# Patient Record
Sex: MALE | Race: WHITE | Employment: OTHER | URBAN - METROPOLITAN AREA
[De-identification: names, ages, dates, MRNs, and addresses within clinical notes are randomized per-mention and may not be internally consistent; named-entity substitution may affect disease eponyms.]

---

## 2017-02-17 ENCOUNTER — ALLIED HEALTH/NURSE VISIT (OUTPATIENT)
Dept: CARDIOLOGY | Facility: CLINIC | Age: 82
End: 2017-02-17
Payer: COMMERCIAL

## 2017-02-17 DIAGNOSIS — Z95.0 CARDIAC PACEMAKER IN SITU: Primary | ICD-10-CM

## 2017-02-17 PROCEDURE — 93296 REM INTERROG EVL PM/IDS: CPT | Performed by: INTERNAL MEDICINE

## 2017-02-17 PROCEDURE — 93294 REM INTERROG EVL PM/LDLS PM: CPT | Performed by: INTERNAL MEDICINE

## 2017-02-17 NOTE — MR AVS SNAPSHOT
"              After Visit Summary   2/17/2017    Bryson Saenz    MRN: [id number]           Patient Information     Date Of Birth          8/20/1922        Visit Information        Provider Department      2/17/2017 8:00 AM RODRIGUEZ TECH1 [facility] [place]        Today's Diagnoses     Cardiac pacemaker in situ    -  1       Follow-ups after your visit        Additional Services     Follow-Up with Cardiologist           Follow-Up with Device Clinic                 Future tests that were ordered for you today     Open Future Orders        Priority Expected Expires Ordered    Follow-Up with Device Clinic Routine 5/17/2017 3/24/2018 2/17/2017    Follow-Up with Cardiologist Routine 5/17/2017 2/17/2018 2/17/2017            Who to contact     If you have questions or need follow up information about today's clinic visit or your schedule please contact [facility] directly at [phone].  Normal or non-critical lab and imaging results will be communicated to you by MyChart, letter or phone within 4 business days after the clinic has received the results. If you do not hear from us within 7 days, please contact the clinic through RF[employer] Global Solutionhart or phone. If you have a critical or abnormal lab result, we will notify you by phone as soon as possible.  Submit refill requests through Sponsify or call your pharmacy and they will forward the refill request to us. Please allow 3 business days for your refill to be completed.          Additional Information About Your Visit        MyChart Information     Sponsify lets you send messages to your doctor, view your test results, renew your prescriptions, schedule appointments and more. To sign up, go to [email or web address] . Click on \"Log in\" on the left side of the screen, which will take you to the Welcome page. Then click on \"Sign up Now\" on the right side of the page.     You will be asked to enter the access " code listed below, as well as some personal information. Please follow the directions to create your username and password.     Your access code is: [email or web address]  Expires: 2017  4:10 PM     Your access code will  in 90 days. If you need help or a new code, please call your [place] clinic or [phone].        Care EveryWhere ID     This is your Care EveryWhere ID. This could be used by other organizations to access your [place] medical records  [id number]         Blood Pressure from Last 3 Encounters:   16 118/62   05/06/15 124/57   04/16/15 124/52    Weight from Last 3 Encounters:   16 88 kg (194 lb)   05/05/15 87.5 kg (192 lb 14.4 oz)   04/16/15 90.6 kg (199 lb 12.8 oz)              We Performed the Following     INTERROGATION DEVICE EVAL REMOTE, PACER/ICD (74602)     PM DEVICE INTERROGATE REMOTE (02420)        Primary Care Provider Office Phone # Fax #    Andrew Raymundo [phone]       ANDREW RAYMUNDO MD [address] [facility] [address]        Thank you!     Thank you for choosing [facility] [place]  for your care. Our goal is always to provide you with excellent care. Hearing back from our patients is one way we can continue to improve our services. Please take a few minutes to complete the written survey that you may receive in the mail after your visit with us. Thank you!             Your Updated Medication List - Protect others around you: Learn how to safely use, store and throw away your medicines at www.disposemymeds.org.          This list is accurate as of: 17  4:10 PM.  Always use your most recent med list.                   Brand Name Dispense Instructions for use    acetaminophen 500 MG tablet    TYLENOL     Take 500-1,000 mg by mouth every 6 hours as needed for mild pain       allopurinol 300 MG tablet    ZYLOPRIM     Take 300 mg by mouth daily       aspirin 81 MG tablet      Take 81 mg by mouth daily        atorvastatin 40 MG tablet    LIPITOR    90 tablet    1TPOQD       FIBER-CAPS PO      Take by mouth daily       furosemide 20 MG tablet    LASIX     Take 20 mg by mouth daily       melatonin 3 MG tablet      Take by mouth nightly as needed for sleep       multivitamin Tabs tablet      Take 1 tablet by mouth daily

## 2017-05-25 ENCOUNTER — ALLIED HEALTH/NURSE VISIT (OUTPATIENT)
Dept: CARDIOLOGY | Facility: CLINIC | Age: 82
End: 2017-05-25
Payer: COMMERCIAL

## 2017-05-25 ENCOUNTER — OFFICE VISIT (OUTPATIENT)
Dept: CARDIOLOGY | Facility: CLINIC | Age: 82
End: 2017-05-25
Attending: INTERNAL MEDICINE
Payer: COMMERCIAL

## 2017-05-25 VITALS
BODY MASS INDEX: 28.05 KG/M2 | HEIGHT: 70 IN | HEART RATE: 81 BPM | SYSTOLIC BLOOD PRESSURE: 114 MMHG | WEIGHT: 195.9 LBS | DIASTOLIC BLOOD PRESSURE: 64 MMHG

## 2017-05-25 DIAGNOSIS — I49.5 SSS (SICK SINUS SYNDROME) (H): Primary | ICD-10-CM

## 2017-05-25 DIAGNOSIS — Z95.0 CARDIAC PACEMAKER IN SITU: ICD-10-CM

## 2017-05-25 PROCEDURE — 93280 PM DEVICE PROGR EVAL DUAL: CPT | Performed by: INTERNAL MEDICINE

## 2017-05-25 PROCEDURE — 99214 OFFICE O/P EST MOD 30 MIN: CPT | Mod: 25 | Performed by: INTERNAL MEDICINE

## 2017-05-25 NOTE — PROGRESS NOTES
HPI and Plan:   See dictation    Orders Placed This Encounter   Procedures     Follow-Up with Cardiologist       Orders Placed This Encounter   Medications     Ammonium Lactate (LAC-HYDRIN FIVE) 5 % LOTN     Sig: Externally apply topically as needed       Encounter Diagnosis   Name Primary?     Cardiac pacemaker in situ        CURRENT MEDICATIONS:  Current Outpatient Prescriptions   Medication Sig Dispense Refill     Ammonium Lactate (LAC-HYDRIN FIVE) 5 % LOTN Externally apply topically as needed       acetaminophen (TYLENOL) 500 MG tablet Take 500-1,000 mg by mouth every 6 hours as needed for mild pain       furosemide (LASIX) 20 MG tablet Take 20 mg by mouth daily       melatonin 3 MG tablet Take by mouth nightly as needed for sleep       multivitamin (OCUVITE) TABS Take 1 tablet by mouth daily       allopurinol (ZYLOPRIM) 300 MG tablet Take 300 mg by mouth daily         ALLERGIES   No Known Allergies    PAST MEDICAL HISTORY:  Past Medical History:   Diagnosis Date     CAD (coronary artery disease)     05/2003 PTCA/Stent LAD , [place]     Cholesterol serum increased      HTN (hypertension)      Hypercholesteremia      Macular degeneration     left eye     Myocardial infarction (H) years ago    with spent placement     Pacemaker      Renal insufficiency      TIA (transient ischaemic attack)      Ventricular tachycardia (H)        PAST SURGICAL HISTORY:  Past Surgical History:   Procedure Laterality Date     HC LEFT HEART CATHETERIZATION  2003 05/2003 PTCA/Stent LAD , [place]     HIP SURGERY         FAMILY HISTORY:  Family History   Problem Relation Age of Onset     Dementia Mother      Aneurysm Father      HEART DISEASE No family hx of        SOCIAL HISTORY:  Social History     Social History     Marital status:      Spouse name: N/A     Number of children: N/A     Years of education: N/A     Social History Main Topics     Smoking status: Former Smoker     Smokeless tobacco: None       "Comment: 1963     Alcohol use Yes      Comment: scotch every once in awhile (1 tonight)     Drug use: No     Sexual activity: Not Asked     Other Topics Concern     Parent/Sibling W/ Cabg, Mi Or Angioplasty Before 65f 55m? No     Weight Concern No     Exercise No     Seat Belt No     Social History Narrative       Review of Systems:  Skin:  Negative     Eyes:  Positive for glasses  ENT:  Negative    Respiratory:  Positive for wheezing  Cardiovascular:    edema;Positive for  Gastroenterology: Negative    Genitourinary:  not assessed    Musculoskeletal:  Negative    Neurologic:  Negative    Psychiatric:  Negative    Heme/Lymph/Imm:  Negative    Endocrine:  Negative      Physical Exam:  Vitals: /64  Pulse 81  Ht 1.778 m (5' 10\")  Wt 88.9 kg (195 lb 14.4 oz)  BMI 28.11 kg/m2    Constitutional:  cooperative, alert and oriented, well developed, well nourished, in no acute distress        Skin:    venous stasis changes      Head:  normocephalic, no masses or lesions        Eyes:  pupils equal and round, conjunctivae and lids unremarkable, sclera white, no xanthalasma, EOMS intact, no nystagmus        ENT:  no pallor or cyanosis, dentition good        Neck:  carotid pulses are full and equal bilaterally, JVP normal, no carotid bruit, no thyromegaly        Chest:  normal breath sounds, clear to auscultation, normal A-P diameter, normal symmetry, normal respiratory excursion, no use of accessory muscles        Cardiac: regular rhythm, normal S1/S2, no S3 or S4, apical impulse not displaced, no murmurs, gallops or rubs                  Abdomen:  abdomen soft, non-tender, BS normoactive, no mass, no HSM, no bruits        Vascular:   pulses below the femoral arteries are diminished                                    Extremities and Back:  no spinal abnormalities noted;normal muscle strength and tone        Neurological:             Recent Lab Results:  LIPID RESULTS:  Lab Results   Component Value Date    CHOL 177 " 09/02/2015    HDL 57 09/02/2015    LDL 85 09/02/2015    TRIG 173 09/02/2015    CHOLHDLRATIO 2.6 11/05/2005       LIVER ENZYME RESULTS:  Lab Results   Component Value Date    AST 25 07/26/2013    ALT 30 07/26/2013       CBC RESULTS:  Lab Results   Component Value Date    WBC 9.7 09/02/2015    RBC 4.52 05/05/2015    HGB 13.4 09/02/2015    HCT 40.9 05/05/2015    MCV 95.2 09/02/2015    MCH 30.3 05/05/2015    MCHC 33.5 05/05/2015    RDW 14.4 05/05/2015     09/02/2015     (L) 05/05/2015       BMP RESULTS:  Lab Results   Component Value Date     04/04/2016    POTASSIUM 4.1 04/04/2016    CHLORIDE 101 04/04/2016    CO2 23 04/04/2016    ANIONGAP 7 05/05/2015     (A) 04/04/2016    BUN 39 04/04/2016    CR 1.74 04/04/2016    GFRESTIMATED 39 (L) 05/05/2015    GFRESTBLACK 48 (L) 05/05/2015    CHESTER 9.1 04/04/2016        A1C RESULTS:  No results found for: A1C    INR RESULTS:  Lab Results   Component Value Date    INR 1.04 07/26/2013    INR 0.98 07/02/2013           CC  Tavares Law MD   [facility]  [address]    [place] [address]

## 2017-05-25 NOTE — PROGRESS NOTES
[place] [employer] Advantio (D) Pacemaker Device Check  AP: 41 % : <1 %  Mode: DDDR         Underlying Rhythm: SR  Heart Rate: good variability  Sensing: WNL    Pacing Threshold: WNL   Impedance: WNL  Battery Status: 5.5 yrs estimated longevity   Atrial Arrhythmia: none  Ventricular Arrhythmia: 3 episodes of NSVT, 2 with EGMs. First EGM shows 9 beats of NSVT,  bpm. Second EGM shows 2 beats of NSVT, followed by on AP/VS beat, followed by another NSVT 4 beats, average V rate 162 bpm.   Setting Change: none    Care Plan: remote in 3 months, scheduled.   Pt has OV with Dr. Dawkins today.    MARCIA RN

## 2017-05-25 NOTE — LETTER
5/25/2017    Andrew العلي MD  [address] [place] [address]      RE: Bryson Saenz       Dear Colleague,    I had the pleasure of seeing Bryson Saenz in the [facility].    It is a pleasure for me to follow up with Mr. Saenz.  He is a very delightful 89 year gentleman who remains physically vigorous.  He does have sick sinus syndrome for which a pacemaker has been implanted.  There was a diagnosis of paroxysmal atrial fibrillation as well, though I am very happy to see that recent pacer interrogation has not revealed any episodes of atrial fibrillation.  In addition, there is a history of coronary artery disease.  In 2003, he had stenting to his LAD and possibly the PDA in [place].      He is quite active.  He reports no chest pains on exertion.  He tells me he feels well.  He has no PND or orthopnea.  He does have ankle swelling which I think is chiefly due to venous stasis.  If it gets bad he would take Lasix as required.  He is bothered by bruising on his hand.  He has stopped taking aspirin and this condition has improved.      PHYSICAL EXAMINATION:  Reveals a normal JVP, clear chest and normal heart sounds.     Outpatient Encounter Prescriptions as of 5/25/2017   Medication Sig Dispense Refill     Ammonium Lactate (LAC-HYDRIN FIVE) 5 % LOTN Externally apply topically as needed       acetaminophen (TYLENOL) 500 MG tablet Take 500-1,000 mg by mouth every 6 hours as needed for mild pain       furosemide (LASIX) 20 MG tablet Take 20 mg by mouth daily       melatonin 3 MG tablet Take by mouth nightly as needed for sleep       multivitamin (OCUVITE) TABS Take 1 tablet by mouth daily       allopurinol (ZYLOPRIM) 300 MG tablet Take 300 mg by mouth daily       [DISCONTINUED] Calcium Polycarbophil (FIBER-CAPS PO) Take by mouth daily       [DISCONTINUED] aspirin 81 MG tablet Take 81 mg by mouth daily       [DISCONTINUED] atorvastatin (LIPITOR) 40 MG tablet 1TPOQD  90 tablet 3     No facility-administered encounter medications on file as of 5/25/2017.       IMPRESSION:   1.  Coronary artery disease.  He is not on any treatment for it.  I did discuss the possibility of restarting aspirin with him and I think this would be good.   2.  Dyslipidemia, not on a statin by intention.   3.  Good blood pressure control.   4.  Paroxysmal atrial fibrillation.  Not on oral anticoagulation per patient preference.   5.  Sick sinus syndrome status post pacer insertion.  No evidence of pacer dysfunction.  I will continue to follow up with him at annual intervals.     Again, thank you for allowing me to participate in the care of your patient.      Sincerely,    DR MOISE CAVAZOS MD     [facility]

## 2017-05-25 NOTE — MR AVS SNAPSHOT
"              After Visit Summary   5/25/2017    Bryson Saenz    MRN: [id number]           Patient Information     Date Of Birth          8/20/1922        Visit Information        Provider Department      5/25/2017 4:15 PM Vernon Dawkins MD [facility]        Today's Diagnoses     Cardiac pacemaker in situ           Follow-ups after your visit        Your next 10 appointments already scheduled     Aug 28, 2017  8:00 AM CDT   Remote PPM Check with RODRIGUEZ TECH1   [facility] ([facility])    [address] [place] [id number] [phone]           This appointment is for a remote check of your pacemaker.  This is not an appointment at the office.              Who to contact     If you have questions or need follow up information about today's clinic visit or your schedule please contact [facility] directly at [phone].  Normal or non-critical lab and imaging results will be communicated to you by startuplyhart, letter or phone within 4 business days after the clinic has received the results. If you do not hear from us within 7 days, please contact the clinic through startuplyhart or phone. If you have a critical or abnormal lab result, we will notify you by phone as soon as possible.  Submit refill requests through [employer] or call your pharmacy and they will forward the refill request to us. Please allow 3 business days for your refill to be completed.          Additional Information About Your Visit        startuplyharInquirly Information     [employer] lets you send messages to your doctor, view your test results, renew your prescriptions, schedule appointments and more. To sign up, go to [email or web address] . Click on \"Log in\" on the left side of the screen, which will take you to the Welcome page. Then click on \"Sign up Now\" on the right side of the page.     You will be " "asked to enter the access code listed below, as well as some personal information. Please follow the directions to create your username and password.     Your access code is: [email or web address]  Expires: 2017  3:44 PM     Your access code will  in 90 days. If you need help or a new code, please call your [place] clinic or [phone].        Care EveryWhere ID     This is your Care EveryWhere ID. This could be used by other organizations to access your [place] medical records  [id number]        Your Vitals Were     Pulse Height BMI (Body Mass Index)             81 1.778 m (5' 10\") 28.11 kg/m2          Blood Pressure from Last 3 Encounters:   17 114/64   16 118/62   05/06/15 124/57    Weight from Last 3 Encounters:   17 88.9 kg (195 lb 14.4 oz)   16 88 kg (194 lb)   05/05/15 87.5 kg (192 lb 14.4 oz)              We Performed the Following     Follow-Up with Cardiologist        Primary Care Provider Office Phone # Fax #    Andrew Raymundo [phone]       [place]RE[place]NDO [place] [address] [place] [address]        Thank you!     Thank you for choosing [facility] [place]  for your care. Our goal is always to provide you with excellent care. Hearing back from our patients is one way we can continue to improve our services. Please take a few minutes to complete the written survey that you may receive in the mail after your visit with us. Thank you!             Your Updated Medication List - Protect others around you: Learn how to safely use, store and throw away your medicines at www.disposemymeds.org.          This list is accurate as of: 17  4:46 PM.  Always use your most recent med list.                   Brand Name Dispense Instructions for use    acetaminophen 500 MG tablet    TYLENOL     Take 500-1,000 mg by mouth every 6 hours as needed for mild pain       allopurinol 300 MG tablet    ZYLOPRIM     Take 300 mg by mouth daily "       furosemide 20 MG tablet    LASIX     Take 20 mg by mouth daily       LAC-HYDRIN FIVE 5 % Lotn   Generic drug:  Ammonium Lactate      Externally apply topically as needed       melatonin 3 MG tablet      Take by mouth nightly as needed for sleep       multivitamin Tabs tablet      Take 1 tablet by mouth daily

## 2017-05-25 NOTE — MR AVS SNAPSHOT
After Visit Summary   5/25/2017    Bryson Saenz    MRN: [id number]           Patient Information     Date Of Birth          8/20/1922        Visit Information        Provider Department      5/25/2017 3:15 PM MICHAEL FERNANDEZN [facility]        Today's Diagnoses     SSS (sick sinus syndrome) (H)    -  1    Cardiac pacemaker in situ           Follow-ups after your visit        Your next 10 appointments already scheduled     May 25, 2017  4:15 PM CDT   Return Visit with Vernon Dawkins MD   [facility] ([facility])    [address]  [facility] [id number] [phone]            Aug 28, 2017  8:00 AM CDT   Remote PPM Check with MICHAEL TECH1   [facility] ([facility])    [address]  [facility] [id number] [phone]           This appointment is for a remote check of your pacemaker.  This is not an appointment at the office.              Who to contact     If you have questions or need follow up information about today's clinic visit or your schedule please contact [facility] directly at [phone].  Normal or non-critical lab and imaging results will be communicated to you by ACTV8hart, letter or phone within 4 business days after the clinic has received the results. If you do not hear from us within 7 days, please contact the clinic through ACTV8hart or phone. If you have a critical or abnormal lab result, we will notify you by phone as soon as possible.  Submit refill requests through [employer] or call your pharmacy and they will forward the refill request to us. Please allow 3 business days for your refill to be completed.          Additional Information About Your Visit        ACTV8hart Information     [employer] lets you send messages to your doctor, view your test results, renew your  "prescriptions, schedule appointments and more. To sign up, go to [email or web address] . Click on \"Log in\" on the left side of the screen, which will take you to the Welcome page. Then click on \"Sign up Now\" on the right side of the page.     You will be asked to enter the access code listed below, as well as some personal information. Please follow the directions to create your username and password.     Your access code is: [email or web address]  Expires: 2017  3:44 PM     Your access code will  in 90 days. If you need help or a new code, please call your [place] clinic or [phone].        Care EveryWhere ID     This is your Care EveryWhere ID. This could be used by other organizations to access your [place] medical records  [id number]         Blood Pressure from Last 3 Encounters:   16 118/62   05/06/15 124/57   04/16/15 124/52    Weight from Last 3 Encounters:   16 88 kg (194 lb)   05/05/15 87.5 kg (192 lb 14.4 oz)   04/16/15 90.6 kg (199 lb 12.8 oz)              We Performed the Following     Follow-Up with Device Clinic     PM DEVICE PROGRAMMING EVAL, DUAL LEAD PACER (27057)        Primary Care Provider Office Phone # Fax #    Andrew Raymundo [phone]       ANDREW RAYMUNDO [place] [address] [place] [address]        Thank you!     Thank you for choosing [facility] [place]  for your care. Our goal is always to provide you with excellent care. Hearing back from our patients is one way we can continue to improve our services. Please take a few minutes to complete the written survey that you may receive in the mail after your visit with us. Thank you!             Your Updated Medication List - Protect others around you: Learn how to safely use, store and throw away your medicines at www.disposemymeds.org.          This list is accurate as of: 17  3:44 PM.  Always use your most recent med list.                   Brand Name Dispense " Instructions for use    acetaminophen 500 MG tablet    TYLENOL     Take 500-1,000 mg by mouth every 6 hours as needed for mild pain       allopurinol 300 MG tablet    ZYLOPRIM     Take 300 mg by mouth daily       aspirin 81 MG tablet      Take 81 mg by mouth daily       atorvastatin 40 MG tablet    LIPITOR    90 tablet    1TPOQD       FIBER-CAPS PO      Take by mouth daily       furosemide 20 MG tablet    LASIX     Take 20 mg by mouth daily       melatonin 3 MG tablet      Take by mouth nightly as needed for sleep       multivitamin Tabs tablet      Take 1 tablet by mouth daily

## 2017-05-26 NOTE — PROGRESS NOTES
HISTORY OF PRESENT ILLNESS:  It is a pleasure for me to follow up with Mr. Johnston.  He is a very delightful 89 year gentleman who remains physically vigorous.  He does have sick sinus syndrome for which a pacemaker has been implanted.  There was a diagnosis of paroxysmal atrial fibrillation as well, though I am very happy to see that recent pacer interrogation has not revealed any episodes of atrial fibrillation.  In addition, there is a history of coronary artery disease.  In , he had stenting to his LAD and possibly the PDA in [place].      He is quite active.  He reports no chest pains on exertion.  He tells me he feels well.  He has no PND or orthopnea.  He does have ankle swelling which I think is chiefly due to venous stasis.  If it gets bad he would take Lasix as required.  He is bothered by bruising on his hand.  He has stopped taking aspirin and this condition has improved.      PHYSICAL EXAMINATION:  Reveals a normal JVP, clear chest and normal heart sounds.      IMPRESSION:   1.  Coronary artery disease.  He is not on any treatment for it.  I did discuss the possibility of restarting aspirin with him and I think this would be good.   2.  Dyslipidemia, not on a statin by intention.   3.  Good blood pressure control.   4.  Paroxysmal atrial fibrillation.  Not on oral anticoagulation per patient preference.   5.  Sick sinus syndrome status post pacer insertion.  No evidence of pacer dysfunction.  I will continue to follow up with him at annual intervals.         MOISE CAVAZOS MD, [facility]             D: 2017 16:56   T: 2017 14:11   MT: AMILCAR      Name:     HANNAH JOHNSTON   MRN:      [id number]        Account:      [id number]   :      1922           Service Date: 2017      Document: [id number]

## 2017-08-29 ENCOUNTER — ALLIED HEALTH/NURSE VISIT (OUTPATIENT)
Dept: CARDIOLOGY | Facility: CLINIC | Age: 82
End: 2017-08-29
Payer: COMMERCIAL

## 2017-08-29 DIAGNOSIS — Z95.0 CARDIAC PACEMAKER IN SITU: Primary | ICD-10-CM

## 2017-08-29 PROCEDURE — 93296 REM INTERROG EVL PM/IDS: CPT | Performed by: INTERNAL MEDICINE

## 2017-08-29 PROCEDURE — 93294 REM INTERROG EVL PM/LDLS PM: CPT | Performed by: INTERNAL MEDICINE

## 2017-08-29 NOTE — MR AVS SNAPSHOT
"              After Visit Summary   8/29/2017    Bryson Saenz    MRN: [id number]           Patient Information     Date Of Birth          8/20/1922        Visit Information        Provider Department      8/29/2017 4:45 PM RODRIGUEZ TECH1 [facility]        Today's Diagnoses     Cardiac pacemaker in situ    -  1       Follow-ups after your visit        Your next 10 appointments already scheduled     Aug 29, 2017  4:45 PM CDT   Remote PPM Check with RODRIGUEZ TECH1   [facility] ([facility])    [address] [place] [id number] [phone]           This appointment is for a remote check of your pacemaker.  This is not an appointment at the office.              Who to contact     If you have questions or need follow up information about today's clinic visit or your schedule please contact [facility] directly at [phone].  Normal or non-critical lab and imaging results will be communicated to you by Sapheonhart, letter or phone within 4 business days after the clinic has received the results. If you do not hear from us within 7 days, please contact the clinic through Sapheonhart or phone. If you have a critical or abnormal lab result, we will notify you by phone as soon as possible.  Submit refill requests through [employer] or call your pharmacy and they will forward the refill request to us. Please allow 3 business days for your refill to be completed.          Additional Information About Your Visit        Sapheonhart Information     [employer] lets you send messages to your doctor, view your test results, renew your prescriptions, schedule appointments and more. To sign up, go to [email or web address] . Click on \"Log in\" on the left side of the screen, which will take you to the Welcome page. Then click on \"Sign up Now\" on the right side of the page.     You will be asked to " enter the access code listed below, as well as some personal information. Please follow the directions to create your username and password.     Your access code is: [email or web address]  Expires: 2017  1:19 PM     Your access code will  in 90 days. If you need help or a new code, please call your [place] clinic or [phone].        Care EveryWhere ID     This is your Care EveryWhere ID. This could be used by other organizations to access your [place] medical records  [id number]         Blood Pressure from Last 3 Encounters:   17 114/64   16 118/62   05/06/15 124/57    Weight from Last 3 Encounters:   17 88.9 kg (195 lb 14.4 oz)   16 88 kg (194 lb)   05/05/15 87.5 kg (192 lb 14.4 oz)              We Performed the Following     INTERROGATION DEVICE EVAL REMOTE, PACER/ICD (32663)     PM DEVICE INTERROGATE REMOTE (24850)        Primary Care Provider Office Phone # Fax #    Minal العلي [phone]       MINAL العلي MD [address] [facility] [facility] [address]        Equal Access to Services     [facility] : Hadii aad ku hadasho Soomaali, waaxda luqadaha, qaybta kaalmada adeegyada, waxay idiin hayaan emileg lefty wyatt . So [facility] [phone].    ATENCIÓN: Si habla español, tiene a castro disposición servicios gratuitos de asistencia lingüística. Llame al [phone].    We comply with applicable federal civil rights laws and [place] laws. We do not discriminate on the basis of race, color, national origin, age, disability sex, sexual orientation or gender identity.            Thank you!     Thank you for choosing [facility]  for your care. Our goal is always to provide you with excellent care. Hearing back from our patients is one way we can continue to improve our services. Please take a few minutes to complete the written survey that you may receive in the mail after your visit with us. Thank you!             Your Updated  Medication List - Protect others around you: Learn how to safely use, store and throw away your medicines at www.disposemymeds.org.          This list is accurate as of: 8/29/17  1:19 PM.  Always use your most recent med list.                   Brand Name Dispense Instructions for use Diagnosis    acetaminophen 500 MG tablet    TYLENOL     Take 500-1,000 mg by mouth every 6 hours as needed for mild pain        allopurinol 300 MG tablet    ZYLOPRIM     Take 300 mg by mouth daily        furosemide 20 MG tablet    LASIX     Take 20 mg by mouth daily        LAC-HYDRIN FIVE 5 % Lotn   Generic drug:  Ammonium Lactate      Externally apply topically as needed        melatonin 3 MG tablet      Take by mouth nightly as needed for sleep        multivitamin Tabs tablet      Take 1 tablet by mouth daily

## 2017-08-29 NOTE — PROGRESS NOTES
[employer] Advantio K063 (D) Remote PPM Device Check  AP: 67% : 1%  Mode: DDDR        Presenting Rhythm: AP/VS  Heart Rate: adequate heart rates per histogram  Sensing: RA: not performed RV: stable    Pacing Threshold: RA: not performed RV: stable    Impedance: stable  Battery Status: 5 years remaining  Atrial Arrhythmia: 10 mode switch episodes comprising <1% of the time. EGMs available show Afib with all episodes occurring on the same day for a combined total of 1 hr and 52 min. Controlled vent response while in mode switch. Taking ASA only. Per Dr. Dawkins's last note, pt declines anticoagulants.   Ventricular Arrhythmia: none     Care Plan: F/U Latitude NXT q 3 months.  No answer, left message with results and next transmission date on nurse line at care facility. Radha FISHER

## 2017-11-28 ENCOUNTER — ALLIED HEALTH/NURSE VISIT (OUTPATIENT)
Dept: CARDIOLOGY | Facility: CLINIC | Age: 82
End: 2017-11-28
Payer: COMMERCIAL

## 2017-11-28 DIAGNOSIS — Z95.0 CARDIAC PACEMAKER IN SITU: Primary | ICD-10-CM

## 2017-11-28 PROCEDURE — 93294 REM INTERROG EVL PM/LDLS PM: CPT | Performed by: INTERNAL MEDICINE

## 2017-11-28 PROCEDURE — 93296 REM INTERROG EVL PM/IDS: CPT | Performed by: INTERNAL MEDICINE

## 2017-11-28 NOTE — MR AVS SNAPSHOT
"              After Visit Summary   2017    Bryson Saenz    MRN: [id number]           Patient Information     Date Of Birth          1922        Visit Information        Provider Department      2017 11:30 AM RODRIGUEZ TECH1 [facility]        Today's Diagnoses     Cardiac pacemaker in situ    -  1       Follow-ups after your visit        Who to contact     If you have questions or need follow up information about today's clinic visit or your schedule please contact [facility] directly at [phone].  Normal or non-critical lab and imaging results will be communicated to you by Dogeohart, letter or phone within 4 business days after the clinic has received the results. If you do not hear from us within 7 days, please contact the clinic through Mobile Patrol[employer] or phone. If you have a critical or abnormal lab result, we will notify you by phone as soon as possible.  Submit refill requests through [employer] or call your pharmacy and they will forward the refill request to us. Please allow 3 business days for your refill to be completed.          Additional Information About Your Visit        MyChart Information     [employer] lets you send messages to your doctor, view your test results, renew your prescriptions, schedule appointments and more. To sign up, go to [email or web address] . Click on \"Log in\" on the left side of the screen, which will take you to the Welcome page. Then click on \"Sign up Now\" on the right side of the page.     You will be asked to enter the access code listed below, as well as some personal information. Please follow the directions to create your username and password.     Your access code is: [email or web address]  Expires: 2018  1:35 PM     Your access code will  in 90 days. If you need help or a new code, please call your [place] clinic or [phone].        Care EveryWhere ID     This is your Care " EveryWhere ID. This could be used by other organizations to access your [facility] medical records  [id number]         Blood Pressure from Last 3 Encounters:   05/25/17 114/64   04/26/16 118/62   05/06/15 124/57    Weight from Last 3 Encounters:   05/25/17 88.9 kg (195 lb 14.4 oz)   04/26/16 88 kg (194 lb)   05/05/15 87.5 kg (192 lb 14.4 oz)              We Performed the Following     INTERROGATION DEVICE EVAL REMOTE, PACER/ICD (81234)     PM DEVICE INTERROGATE REMOTE (99506)        Primary Care Provider Office Phone # Fax #    Minal العلي [phone]       MINAL العلي MD [address] [place] [place] [address]        Equal Access to Services     [facility]: Hadii aad ku hadasho Soomaali, waaxda luqadaha, qaybta kaalmada adeegyada, waxay idiin hayaan mounika kharaluisana wyatt . So [facility] [phone].    ATENCIÓN: Si habla español, tiene a castro disposición servicios gratuitos de asistencia lingüística. Adrian al [phone].    We comply with applicable federal civil rights laws and [place] laws. We do not discriminate on the basis of race, color, national origin, age, disability, sex, sexual orientation, or gender identity.            Thank you!     Thank you for choosing [facility]  for your care. Our goal is always to provide you with excellent care. Hearing back from our patients is one way we can continue to improve our services. Please take a few minutes to complete the written survey that you may receive in the mail after your visit with us. Thank you!             Your Updated Medication List - Protect others around you: Learn how to safely use, store and throw away your medicines at www.disposemymeds.org.          This list is accurate as of: 11/28/17  1:35 PM.  Always use your most recent med list.                   Brand Name Dispense Instructions for use Diagnosis    acetaminophen 500 MG tablet    TYLENOL     Take 500-1,000 mg by mouth every 6 hours  as needed for mild pain        allopurinol 300 MG tablet    ZYLOPRIM     Take 300 mg by mouth daily        furosemide 20 MG tablet    LASIX     Take 20 mg by mouth daily        LAC-HYDRIN FIVE 5 % Lotn   Generic drug:  Ammonium Lactate      Externally apply topically as needed        melatonin 3 MG tablet      Take by mouth nightly as needed for sleep        multivitamin Tabs tablet      Take 1 tablet by mouth daily

## 2017-11-28 NOTE — PROGRESS NOTES
[employer] Advantio (D) Remote PPM Device Check  AP: 58 % : 1 %  Mode: DDDR        Presenting Rhythm: AS/VS, rate 80bpm  Heart Rate: Adequate rates per histogram  Sensing: Stable    Pacing Threshold: Stable    Impedance: Stable  Battery Status: 5 years  Atrial Arrhythmia: 10 mode switch episodes comprising <1% of the time (13 minutes 25 seconds). Longest episode lasted 4 minutes 25 seconds. EGMs available show Afib, all but one episode occurring on the same day. Taking ASA only. Per Dr. Dawkins's last note, pt declines anticoagulants. .  Ventricular Arrhythmia: 1 ventricular high rate. EGM shows As=Vs for PAT lasting 12 beats, rates 105-160bpm. Reviewed with LORA Joseph.      Care Plan: F/u PPM Latitude q 3 months. LM with results. PHILLIP [place]

## 2018-01-10 ENCOUNTER — RECORDS - HEALTHEAST (OUTPATIENT)
Dept: LAB | Facility: CLINIC | Age: 83
End: 2018-01-10

## 2018-01-11 LAB
ANION GAP SERPL CALCULATED.3IONS-SCNC: 8 MMOL/L (ref 5–18)
BUN SERPL-MCNC: 44 MG/DL (ref 8–28)
CALCIUM SERPL-MCNC: 8.8 MG/DL (ref 8.5–10.5)
CHLORIDE BLD-SCNC: 109 MMOL/L (ref 98–107)
CO2 SERPL-SCNC: 25 MMOL/L (ref 22–31)
CREAT SERPL-MCNC: 1.4 MG/DL (ref 0.7–1.3)
GFR SERPL CREATININE-BSD FRML MDRD: 47 ML/MIN/1.73M2
GLUCOSE BLD-MCNC: 128 MG/DL (ref 70–125)
POTASSIUM BLD-SCNC: 4.1 MMOL/L (ref 3.5–5)
SODIUM SERPL-SCNC: 142 MMOL/L (ref 136–145)

## 2018-03-20 ENCOUNTER — ALLIED HEALTH/NURSE VISIT (OUTPATIENT)
Dept: CARDIOLOGY | Facility: CLINIC | Age: 83
End: 2018-03-20
Payer: COMMERCIAL

## 2018-03-20 DIAGNOSIS — Z95.0 CARDIAC PACEMAKER IN SITU: Primary | ICD-10-CM

## 2018-03-20 PROCEDURE — 93294 REM INTERROG EVL PM/LDLS PM: CPT | Performed by: INTERNAL MEDICINE

## 2018-03-20 PROCEDURE — 93296 REM INTERROG EVL PM/IDS: CPT | Performed by: INTERNAL MEDICINE

## 2018-03-20 NOTE — PROGRESS NOTES
[employer] Advantio (D) Remote PPM Device Check  AP: 61 % : 1 %  Mode: DDDR        Presenting Rhythm: AP/VS  Heart Rate: Adequate rates per histogram  Sensing: Stable    Pacing Threshold: Stable    Impedance: Stable  Battery Status: 4.5 years  Atrial Arrhythmia: 10 mode switch episodes comprising <1% of the time. Longest episode lasted 10 minutes 57 seconds, ventricular rates controlled. EGMs available suggestive of Afib, majority of episodes occurred on the same day. Taking ASA only. Per Dr. Dawkins's last note, pt declines anticoagulants.  Ventricular Arrhythmia: 3 ventricular high rates. 2 EGMs available show As>Vs for AFib with RVR lasting 2-4 seconds, rates 135-170bpm. Reviewed with LORA Ratliff.       Care Plan: F/u Annual threshold in 3 months, order placed. LM with results.  PHILLIP [place]

## 2018-05-21 ENCOUNTER — OFFICE VISIT (OUTPATIENT)
Dept: CARDIOLOGY | Facility: CLINIC | Age: 83
End: 2018-05-21
Attending: INTERNAL MEDICINE
Payer: COMMERCIAL

## 2018-05-21 ENCOUNTER — ALLIED HEALTH/NURSE VISIT (OUTPATIENT)
Dept: CARDIOLOGY | Facility: CLINIC | Age: 83
End: 2018-05-21
Payer: COMMERCIAL

## 2018-05-21 VITALS
BODY MASS INDEX: 28.22 KG/M2 | DIASTOLIC BLOOD PRESSURE: 52 MMHG | SYSTOLIC BLOOD PRESSURE: 110 MMHG | HEART RATE: 76 BPM | WEIGHT: 197.1 LBS | HEIGHT: 70 IN

## 2018-05-21 DIAGNOSIS — Z95.0 CARDIAC PACEMAKER IN SITU: ICD-10-CM

## 2018-05-21 DIAGNOSIS — E78.49 OTHER HYPERLIPIDEMIA: ICD-10-CM

## 2018-05-21 DIAGNOSIS — I49.5 SSS (SICK SINUS SYNDROME) (H): Primary | ICD-10-CM

## 2018-05-21 DIAGNOSIS — I25.10 CORONARY ARTERY DISEASE INVOLVING NATIVE CORONARY ARTERY OF NATIVE HEART WITHOUT ANGINA PECTORIS: Primary | ICD-10-CM

## 2018-05-21 PROCEDURE — 93280 PM DEVICE PROGR EVAL DUAL: CPT | Performed by: INTERNAL MEDICINE

## 2018-05-21 PROCEDURE — 99214 OFFICE O/P EST MOD 30 MIN: CPT | Performed by: INTERNAL MEDICINE

## 2018-05-21 NOTE — LETTER
5/21/2018      MD Andrew [place] Md [address]   [place] [address]      RE: Bryson Saenz       Dear Colleague,    I had the pleasure of seeing Brysno Saenz in the [facility].    Service Date: 05/21/2018      HISTORY OF PRESENT ILLNESS:  It is always a pleasure for me to see Mr. Saenz who is accompanied by his daughter.  He is here for followup of cardiac pacemaker, paroxysmal atrial fibrillation, coronary artery disease.  By his daughter, Deepthi, description I think he may have had some episodes of diastolic heart failure as well.      This gentleman is now 89.  In 2003, he had stenting to his LAD and possibly the PDA in [place].  Unfortunately, we do not have those records.  Since then he has been diagnosed with sick sinus syndrome.  A pacer was implanted.  He has atrial fibrillation detected as well.  Fortunately, this is paroxysmal.  Pacer interrogation demonstrated less than 1% atrial fibrillation burden with the longest episode being only 12 minutes.  By mutual agreement he is not on oral anticoagulation and he is doing fine.        He has no chest pains.  His daughter describes occasional episodes of coughing with shortness of breath and increased fluid swelling, but these have all responded nicely to an increased dose of Lasix.  By mutual agreement, he is also not on a statin.        Cardiac examination today reveals no evidence of congestive heart failure.      IMPRESSION:   1.  Stable coronary artery disease with no symptoms of angina.   2.  Probable episodic diastolic heart failure with good response to oral Lasix.  His daughter is a retired anesthesiologist and she is extremely competent in watching over him and increasing diuretic dosage as necessary.   3.  Decent blood pressure.   4.  Sick sinus syndrome with normally functioning pacemaker.   5.  Paroxysmal atrial fibrillation.  Not a big issue at this time.      I will continue  annual followup.         MOISE CAVAZOS MD, [facility]             D: 2018   T: 2018   MT: AMILCAR      Name:     HANNAH JOHNSTON   MRN:      [id number]        Account:      [id number]   :      1922           Service Date: 2018      Document: [id number]         Outpatient Encounter Prescriptions as of 2018   Medication Sig Dispense Refill     acetaminophen (TYLENOL) 500 MG tablet Take 500-1,000 mg by mouth every 6 hours as needed for mild pain       allopurinol (ZYLOPRIM) 300 MG tablet Take 300 mg by mouth daily       Ammonium Lactate (LAC-HYDRIN FIVE) 5 % LOTN Externally apply topically as needed       aspirin 81 MG tablet Take by mouth daily       furosemide (LASIX) 20 MG tablet Take 20 mg by mouth daily       melatonin 3 MG tablet Take by mouth nightly as needed for sleep       multivitamin (OCUVITE) TABS Take 1 tablet by mouth daily       No facility-administered encounter medications on file as of 2018.        Again, thank you for allowing me to participate in the care of your patient.      Sincerely,    DR MOISE CAVAZOS MD     [facility]

## 2018-05-21 NOTE — PROGRESS NOTES
[place] [employer] Advantio (D) Pacemaker Device Check    AP: 66 % : <1 %  Mode: DDDR         Underlying Rhythm: SB 50s  Heart Rate: stable with good variability, patient ambulates slowly with a walker  Sensing: WNL    Pacing Threshold: WNL   Impedance: WNL  Battery Status: estimated 4 years longevity remaining   Device Site: remains well healed  Atrial Arrhythmia: Since 3/20 there have been 9 ATR episodes, 7 occurring on 5/14 - longest duration 12 minutes. Total burden for the year is <1%. He declines anticoagulation  Ventricular Arrhythmia: 2 episodes logged as NSVT - EGMs show 2-4 seconds, rates in the 140s-150s  Setting Change: no changes made today     Care Plan: Return to Q3 month remote checks on 8/27/18 - he is seeing Dr Dawkins today. SP

## 2018-05-21 NOTE — MR AVS SNAPSHOT
"              After Visit Summary   5/21/2018    Bryson Saenz    MRN: [id number]           Patient Information     Date Of Birth          8/20/1922        Visit Information        Provider Department      5/21/2018 9:45 AM Vernon Dawkins MD [facility]        Today's Diagnoses     Coronary artery disease involving native coronary artery of native heart without angina pectoris    -  1    Cardiac pacemaker in situ        Other hyperlipidemia           Follow-ups after your visit        Your next 10 appointments already scheduled     Aug 27, 2018  8:00 AM CDT   Remote PPM Check with RODRIGUEZ TECH1   [facility] ([facility])    [address]  [facility] [id number] [phone] OPT 2           This appointment is for a remote check of your pacemaker.  This is not an appointment at the office.              Who to contact     If you have questions or need follow up information about today's clinic visit or your schedule please contact [facility] directly at [phone].  Normal or non-critical lab and imaging results will be communicated to you by Individual Digitalhart, letter or phone within 4 business days after the clinic has received the results. If you do not hear from us within 7 days, please contact the clinic through [employer] or phone. If you have a critical or abnormal lab result, we will notify you by phone as soon as possible.  Submit refill requests through [employer] or call your pharmacy and they will forward the refill request to us. Please allow 3 business days for your refill to be completed.          Additional Information About Your Visit        Individual Digitalhart Information     [employer] lets you send messages to your doctor, view your test results, renew your prescriptions, schedule appointments and more. To sign up, go to [email or web address] . Click on \"Log in\" on the left side " "of the screen, which will take you to the Welcome page. Then click on \"Sign up Now\" on the right side of the page.     You will be asked to enter the access code listed below, as well as some personal information. Please follow the directions to create your username and password.     Your access code is: [email or web address]  Expires: 2018 11:56 AM     Your access code will  in 90 days. If you need help or a new code, please call your [place] clinic or [phone].        Care EveryWhere ID     This is your Care EveryWhere ID. This could be used by other organizations to access your [place] medical records  [id number]        Your Vitals Were     Pulse Height BMI (Body Mass Index)             76 1.778 m (5' 10\") 28.28 kg/m2          Blood Pressure from Last 3 Encounters:   18 110/52   17 114/64   16 118/62    Weight from Last 3 Encounters:   18 89.4 kg (197 lb 1.6 oz)   17 88.9 kg (195 lb 14.4 oz)   16 88 kg (194 lb)              We Performed the Following     Follow-Up with Cardiologist        Primary Care Provider Office Phone # Fax #    Andrew العلي [phone]       ANDREW العلي MD [address] [place] [address]        Equal Access to Services     [facility]: Hadii aad ku hadasho Soomaali, waaxda luqadaha, qaybta kaalmada adeegyada, waxay idiin hayaan mounika wyatt . So [facility] [phone].    ATENCIÓN: Si habla español, tiene a castro disposición servicios gratuitos de asistencia lingüística. Llame al [phone].    We comply with applicable federal civil rights laws and [place] laws. We do not discriminate on the basis of race, color, national origin, age, disability, sex, sexual orientation, or gender identity.            Thank you!     Thank you for choosing [facility]  for your care. Our goal is always to provide you with excellent care. Hearing back from our patients is one way we can " continue to improve our services. Please take a few minutes to complete the written survey that you may receive in the mail after your visit with us. Thank you!             Your Updated Medication List - Protect others around you: Learn how to safely use, store and throw away your medicines at www.disposemymeds.org.          This list is accurate as of 5/21/18 10:20 AM.  Always use your most recent med list.                   Brand Name Dispense Instructions for use Diagnosis    acetaminophen 500 MG tablet    TYLENOL     Take 500-1,000 mg by mouth every 6 hours as needed for mild pain        allopurinol 300 MG tablet    ZYLOPRIM     Take 300 mg by mouth daily        aspirin 81 MG tablet      Take by mouth daily        furosemide 20 MG tablet    LASIX     Take 20 mg by mouth daily        LAC-HYDRIN FIVE 5 % Lotn   Generic drug:  Ammonium Lactate      Externally apply topically as needed        melatonin 3 MG tablet      Take by mouth nightly as needed for sleep        multivitamin Tabs tablet      Take 1 tablet by mouth daily

## 2018-05-21 NOTE — PROGRESS NOTES
HPI and Plan:   See dictation    Orders Placed This Encounter   Procedures     Follow-Up with Cardiologist       Orders Placed This Encounter   Medications     aspirin 81 MG tablet     Sig: Take by mouth daily       Encounter Diagnoses   Name Primary?     Cardiac pacemaker in situ      Coronary artery disease involving native coronary artery of native heart without angina pectoris Yes     Other hyperlipidemia        CURRENT MEDICATIONS:  Current Outpatient Prescriptions   Medication Sig Dispense Refill     acetaminophen (TYLENOL) 500 MG tablet Take 500-1,000 mg by mouth every 6 hours as needed for mild pain       allopurinol (ZYLOPRIM) 300 MG tablet Take 300 mg by mouth daily       Ammonium Lactate (LAC-HYDRIN FIVE) 5 % LOTN Externally apply topically as needed       aspirin 81 MG tablet Take by mouth daily       furosemide (LASIX) 20 MG tablet Take 20 mg by mouth daily       melatonin 3 MG tablet Take by mouth nightly as needed for sleep       multivitamin (OCUVITE) TABS Take 1 tablet by mouth daily         ALLERGIES   No Known Allergies    PAST MEDICAL HISTORY:  Past Medical History:   Diagnosis Date     CAD (coronary artery disease)     05/2003 PTCA/Stent LAD , [place]     Cholesterol serum increased      HTN (hypertension)      Hypercholesteremia      Macular degeneration     left eye     Myocardial infarction years ago    with spent placement     Pacemaker      Renal insufficiency      TIA (transient ischaemic attack)      Ventricular tachycardia (H)        PAST SURGICAL HISTORY:  Past Surgical History:   Procedure Laterality Date     HC LEFT HEART CATHETERIZATION  2003 05/2003 PTCA/Stent LAD , [place]     HIP SURGERY         FAMILY HISTORY:  Family History   Problem Relation Age of Onset     Dementia Mother      Aneurysm Father      HEART DISEASE No family hx of        SOCIAL HISTORY:  Social History     Social History     Marital status:      Spouse name: N/A     Number of  "children: N/A     Years of education: N/A     Social History Main Topics     Smoking status: Former Smoker     Packs/day: 0.50     Types: Pipe, Cigarettes     Quit date: 1963     Smokeless tobacco: Never Used     Alcohol use Yes      Comment: scotch every once in awhile     Drug use: No     Sexual activity: Not Asked     Other Topics Concern     Parent/Sibling W/ Cabg, Mi Or Angioplasty Before 65f 55m? No     Weight Concern No     Exercise No     Seat Belt No     Social History Narrative       Review of Systems:  Skin:  Positive for bruising   Eyes:  Positive for glasses  ENT:  Negative    Respiratory:  Negative    Cardiovascular:    edema;Positive for  Gastroenterology: Negative    Genitourinary:  Positive for urinary frequency  Musculoskeletal:  Positive for joint pain  Neurologic:  Negative    Psychiatric:  Negative    Heme/Lymph/Imm:  Negative    Endocrine:  Negative      Physical Exam:  Vitals: /52  Pulse 76  Ht 1.778 m (5' 10\")  Wt 89.4 kg (197 lb 1.6 oz)  BMI 28.28 kg/m2    Constitutional:  cooperative, alert and oriented, well developed, well nourished, in no acute distress        Skin:    venous stasis changes pacemaker incision in the left infraclavicular area was infected      Head:  normocephalic, no masses or lesions        Eyes:  pupils equal and round, conjunctivae and lids unremarkable, sclera white, no xanthalasma, EOMS intact, no nystagmus        Lymph:      ENT:  no pallor or cyanosis, dentition good        Neck:           Respiratory:  normal breath sounds, clear to auscultation, normal A-P diameter, normal symmetry, normal respiratory excursion, no use of accessory muscles         Cardiac: regular rhythm, normal S1/S2, no S3 or S4, apical impulse not displaced, no murmurs, gallops or rubs                  pulses below the femoral arteries are diminished                                      GI:  abdomen soft, non-tender, BS normoactive, no mass, no HSM, no bruits        Extremities and " Muscular Skeletal:  no spinal abnormalities noted;normal muscle strength and tone   bilateral LE edema;trace          Neurological:  no gross motor deficits        Psych:  Alert and Oriented x 3        Recent Lab Results:  LIPID RESULTS:  Lab Results   Component Value Date    CHOL 177 09/02/2015    HDL 57 09/02/2015    LDL 85 09/02/2015    TRIG 173 09/02/2015    CHOLHDLRATIO 2.6 11/05/2005       LIVER ENZYME RESULTS:  Lab Results   Component Value Date    AST 25 07/26/2013    ALT 30 07/26/2013       CBC RESULTS:  Lab Results   Component Value Date    WBC 9.7 09/02/2015    RBC 4.52 05/05/2015    HGB 13.4 09/02/2015    HCT 40.9 05/05/2015    MCV 95.2 09/02/2015    MCH 30.3 05/05/2015    MCHC 33.5 05/05/2015    RDW 14.4 05/05/2015     09/02/2015     (L) 05/05/2015       BMP RESULTS:  Lab Results   Component Value Date     04/04/2016    POTASSIUM 4.1 04/04/2016    CHLORIDE 101 04/04/2016    CO2 23 04/04/2016    ANIONGAP 7 05/05/2015     (A) 04/04/2016    BUN 39 04/04/2016    CR 1.74 04/04/2016    GFRESTIMATED 39 (L) 05/05/2015    GFRESTBLACK 48 (L) 05/05/2015    CHESTER 9.1 04/04/2016        A1C RESULTS:  No results found for: A1C    INR RESULTS:  Lab Results   Component Value Date    INR 1.04 07/26/2013    INR 0.98 07/02/2013           CC  Vernon Dawkins MD  [address]  [place] [id number]

## 2018-05-21 NOTE — LETTER
5/21/2018    MD [place] [address]  [place] [address]    RE: Bryson Saenz       Dear Colleague,    I had the pleasure of seeing Bryson Saenz in the [facility].    HPI and Plan:   See dictation    Orders Placed This Encounter   Procedures     Follow-Up with Cardiologist       Orders Placed This Encounter   Medications     aspirin 81 MG tablet     Sig: Take by mouth daily       Encounter Diagnoses   Name Primary?     Cardiac pacemaker in situ      Coronary artery disease involving native coronary artery of native heart without angina pectoris Yes     Other hyperlipidemia        CURRENT MEDICATIONS:  Current Outpatient Prescriptions   Medication Sig Dispense Refill     acetaminophen (TYLENOL) 500 MG tablet Take 500-1,000 mg by mouth every 6 hours as needed for mild pain       allopurinol (ZYLOPRIM) 300 MG tablet Take 300 mg by mouth daily       Ammonium Lactate (LAC-HYDRIN FIVE) 5 % LOTN Externally apply topically as needed       aspirin 81 MG tablet Take by mouth daily       furosemide (LASIX) 20 MG tablet Take 20 mg by mouth daily       melatonin 3 MG tablet Take by mouth nightly as needed for sleep       multivitamin (OCUVITE) TABS Take 1 tablet by mouth daily         ALLERGIES   No Known Allergies    PAST MEDICAL HISTORY:  Past Medical History:   Diagnosis Date     CAD (coronary artery disease)     05/2003 PTCA/Stent [place]AD , [place]     Cholesterol serum increased      HTN (hypertension)      Hypercholesteremia      Macular degeneration     left eye     Myocardial infarction years ago    with spent placement     Pacemaker      Renal insufficiency      TIA (transient ischaemic attack)      Ventricular tachycardia (H)        PAST SURGICAL HISTORY:  Past Surgical History:   Procedure Laterality Date     HC LEFT HEART CATHETERIZATION  2003 05/2003 PTCA/Stent LAD , [place]     HIP SURGERY         FAMILY HISTORY:  Family  "History   Problem Relation Age of Onset     Dementia Mother      Aneurysm Father      HEART DISEASE No family hx of        SOCIAL HISTORY:  Social History     Social History     Marital status:      Spouse name: N/A     Number of children: N/A     Years of education: N/A     Social History Main Topics     Smoking status: Former Smoker     Packs/day: 0.50     Types: Pipe, Cigarettes     Quit date: 1963     Smokeless tobacco: Never Used     Alcohol use Yes      Comment: scotch every once in awhile     Drug use: No     Sexual activity: Not Asked     Other Topics Concern     Parent/Sibling W/ Cabg, Mi Or Angioplasty Before 65f 55m? No     Weight Concern No     Exercise No     Seat Belt No     Social History Narrative       Review of Systems:  Skin:  Positive for bruising   Eyes:  Positive for glasses  ENT:  Negative    Respiratory:  Negative    Cardiovascular:    edema;Positive for  Gastroenterology: Negative    Genitourinary:  Positive for urinary frequency  Musculoskeletal:  Positive for joint pain  Neurologic:  Negative    Psychiatric:  Negative    Heme/Lymph/Imm:  Negative    Endocrine:  Negative      Physical Exam:  Vitals: /52  Pulse 76  Ht 1.778 m (5' 10\")  Wt 89.4 kg (197 lb 1.6 oz)  BMI 28.28 kg/m2    Constitutional:  cooperative, alert and oriented, well developed, well nourished, in no acute distress        Skin:    venous stasis changes pacemaker incision in the left infraclavicular area was infected      Head:  normocephalic, no masses or lesions        Eyes:  pupils equal and round, conjunctivae and lids unremarkable, sclera white, no xanthalasma, EOMS intact, no nystagmus        Lymph:      ENT:  no pallor or cyanosis, dentition good        Neck:           Respiratory:  normal breath sounds, clear to auscultation, normal A-P diameter, normal symmetry, normal respiratory excursion, no use of accessory muscles         Cardiac: regular rhythm, normal S1/S2, no S3 or S4, apical impulse not " displaced, no murmurs, gallops or rubs                  pulses below the femoral arteries are diminished                                      GI:  abdomen soft, non-tender, BS normoactive, no mass, no HSM, no bruits        Extremities and Muscular Skeletal:  no spinal abnormalities noted;normal muscle strength and tone   bilateral LE edema;trace          Neurological:  no gross motor deficits        Psych:  Alert and Oriented x 3        Recent Lab Results:  LIPID RESULTS:  Lab Results   Component Value Date    CHOL 177 09/02/2015    HDL 57 09/02/2015    LDL 85 09/02/2015    TRIG 173 09/02/2015    CHOLHDLRATIO 2.6 11/05/2005       LIVER ENZYME RESULTS:  Lab Results   Component Value Date    AST 25 07/26/2013    ALT 30 07/26/2013       CBC RESULTS:  Lab Results   Component Value Date    WBC 9.7 09/02/2015    RBC 4.52 05/05/2015    HGB 13.4 09/02/2015    HCT 40.9 05/05/2015    MCV 95.2 09/02/2015    MCH 30.3 05/05/2015    MCHC 33.5 05/05/2015    RDW 14.4 05/05/2015     09/02/2015     (L) 05/05/2015       BMP RESULTS:  Lab Results   Component Value Date     04/04/2016    POTASSIUM 4.1 04/04/2016    CHLORIDE 101 04/04/2016    CO2 23 04/04/2016    ANIONGAP 7 05/05/2015     (A) 04/04/2016    BUN 39 04/04/2016    CR 1.74 04/04/2016    GFRESTIMATED 39 (L) 05/05/2015    GFRESTBLACK 48 (L) 05/05/2015    CHESTER 9.1 04/04/2016        A1C RESULTS:  No results found for: A1C    INR RESULTS:  Lab Results   Component Value Date    INR 1.04 07/26/2013    INR 0.98 07/02/2013           CC  Moise Dawkins MD  [address]  [place] [address]                    Thank you for allowing me to participate in the care of your patient.      Sincerely,     DR MOISE DAWKINS MD     [facility]    cc:   Moise Dawkins MD  [address]  [place] [address]

## 2018-05-21 NOTE — MR AVS SNAPSHOT
After Visit Summary   5/21/2018    Bryson Saenz    MRN: [id number]           Patient Information     Date Of Birth          8/20/1922        Visit Information        Provider Department      5/21/2018 9:00 AM RODRIGUEZ DCR2 [facility]        Today's Diagnoses     SSS (sick sinus syndrome) (H)    -  1    Cardiac pacemaker in situ           Follow-ups after your visit        Your next 10 appointments already scheduled     May 21, 2018  9:45 AM CDT   Return Visit with Vernon Dawkins MD   [facility] ([facility])    [address] [id number] [phone] OPT 2            Aug 27, 2018  8:00 AM CDT   Remote PPM Check with RODRIGUEZ TECH1   [facility] ([facility])    [address] [address] [phone] OPT 2           This appointment is for a remote check of your pacemaker.  This is not an appointment at the office.              Who to contact     If you have questions or need follow up information about today's clinic visit or your schedule please contact [facility] directly at [phone].  Normal or non-critical lab and imaging results will be communicated to you by Sourcebitshart, letter or phone within 4 business days after the clinic has received the results. If you do not hear from us within 7 days, please contact the clinic through Sourcebitshart or phone. If you have a critical or abnormal lab result, we will notify you by phone as soon as possible.  Submit refill requests through NewGoTos or call your pharmacy and they will forward the refill request to us. Please allow 3 business days for your refill to be completed.          Additional Information About Your Visit        Sourcebitshart Information     NewGoTos lets you send messages to your doctor, view your test results, renew your  "prescriptions, schedule appointments and more. To sign up, go to [email or web address] . Click on \"Log in\" on the left side of the screen, which will take you to the Welcome page. Then click on \"Sign up Now\" on the right side of the page.     You will be asked to enter the access code listed below, as well as some personal information. Please follow the directions to create your username and password.     Your access code is: [email or web address]  Expires: 2018 11:56 AM     Your access code will  in 90 days. If you need help or a new code, please call your [place] clinic or [phone].        Care EveryWhere ID     This is your Care EveryWhere ID. This could be used by other organizations to access your [place] medical records  [id number]         Blood Pressure from Last 3 Encounters:   17 114/64   16 118/62   05/06/15 124/57    Weight from Last 3 Encounters:   17 88.9 kg (195 lb 14.4 oz)   16 88 kg (194 lb)   05/05/15 87.5 kg (192 lb 14.4 oz)              We Performed the Following     Follow-Up with Device Clinic     PM DEVICE PROGRAMMING EVAL, DUAL LEAD PACER ([id number])        Primary Care Provider Office Phone # Fax #    Minal العلي [phone]       MINAL العلي MD [address] [place] [address]        Equal Access to Services     [facility] AH: Hadii aad ku hadasho Soomaali, waaxda luqadaha, qaybta kaalmada adeegyada, jl alejandrain hayheriberto wyatt . So [facility] [phone].    ATENCIÓN: Si habla español, tiene a castro disposición servicios gratuitos de asistencia lingüística. Llame al [phone].    We comply with applicable federal civil rights laws and [place] laws. We do not discriminate on the basis of race, color, national origin, age, disability, sex, sexual orientation, or gender identity.            Thank you!     Thank you for choosing [facility]   [place]  for your care. Our goal is always to provide " you with excellent care. Hearing back from our patients is one way we can continue to improve our services. Please take a few minutes to complete the written survey that you may receive in the mail after your visit with us. Thank you!             Your Updated Medication List - Protect others around you: Learn how to safely use, store and throw away your medicines at www.disposemymeds.org.          This list is accurate as of 5/21/18  9:33 AM.  Always use your most recent med list.                   Brand Name Dispense Instructions for use Diagnosis    acetaminophen 500 MG tablet    TYLENOL     Take 500-1,000 mg by mouth every 6 hours as needed for mild pain        allopurinol 300 MG tablet    ZYLOPRIM     Take 300 mg by mouth daily        furosemide 20 MG tablet    LASIX     Take 20 mg by mouth daily        LAC-HYDRIN FIVE 5 % Lotn   Generic drug:  Ammonium Lactate      Externally apply topically as needed        melatonin 3 MG tablet      Take by mouth nightly as needed for sleep        multivitamin Tabs tablet      Take 1 tablet by mouth daily

## 2018-05-21 NOTE — PROGRESS NOTES
Service Date: 2018      HISTORY OF PRESENT ILLNESS:  It is always a pleasure for me to see Mr. Johnston who is accompanied by his daughter.  He is here for followup of cardiac pacemaker, paroxysmal atrial fibrillation, coronary artery disease.  By his daughter, Deepthi, description I think he may have had some episodes of diastolic heart failure as well.      This gentleman is now 89.  In , he had stenting to his LAD and possibly the PDA in [place].  Unfortunately, we do not have those records.  Since then he has been diagnosed with sick sinus syndrome.  A pacer was implanted.  He has atrial fibrillation detected as well.  Fortunately, this is paroxysmal.  Pacer interrogation demonstrated less than 1% atrial fibrillation burden with the longest episode being only 12 minutes.  By mutual agreement he is not on oral anticoagulation and he is doing fine.        He has no chest pains.  His daughter describes occasional episodes of coughing with shortness of breath and increased fluid swelling, but these have all responded nicely to an increased dose of Lasix.  By mutual agreement, he is also not on a statin.        Cardiac examination today reveals no evidence of congestive heart failure.      IMPRESSION:   1.  Stable coronary artery disease with no symptoms of angina.   2.  Probable episodic diastolic heart failure with good response to oral Lasix.  His daughter is a retired anesthesiologist and she is extremely competent in watching over him and increasing diuretic dosage as necessary.   3.  Decent blood pressure.   4.  Sick sinus syndrome with normally functioning pacemaker.   5.  Paroxysmal atrial fibrillation.  Not a big issue at this time.      I will continue annual followup.         MOISE CAVAZOS MD, FACC             D: 2018   T: 2018   MT: AMILCAR      Name:     HANNAH JOHNSTON   MRN:      [id number]        Account:      [id number]   :      1922           Service Date:  05/21/2018      Document: [id number]

## 2018-09-05 ENCOUNTER — DOCUMENTATION ONLY (OUTPATIENT)
Dept: CARDIOLOGY | Facility: CLINIC | Age: 83
End: 2018-09-05

## 2018-09-05 NOTE — PROGRESS NOTES
Patient missed [employer] transmission on 8/27/18. Sent letter 8/28, no response. Sent 1 week letter today

## 2019-01-01 ENCOUNTER — TELEPHONE (OUTPATIENT)
Dept: CARDIOLOGY | Facility: CLINIC | Age: 84
End: 2019-01-01

## 2019-01-01 ENCOUNTER — RECORDS - HEALTHEAST (OUTPATIENT)
Dept: LAB | Facility: CLINIC | Age: 84
End: 2019-01-01

## 2019-01-01 ENCOUNTER — DOCUMENTATION ONLY (OUTPATIENT)
Dept: CARDIOLOGY | Facility: CLINIC | Age: 84
End: 2019-01-01

## 2019-01-01 ENCOUNTER — OFFICE VISIT (OUTPATIENT)
Dept: CARDIOLOGY | Facility: CLINIC | Age: 84
End: 2019-01-01
Attending: INTERNAL MEDICINE
Payer: COMMERCIAL

## 2019-01-01 VITALS
BODY MASS INDEX: 27.99 KG/M2 | HEART RATE: 84 BPM | DIASTOLIC BLOOD PRESSURE: 78 MMHG | HEIGHT: 70 IN | OXYGEN SATURATION: 92 % | SYSTOLIC BLOOD PRESSURE: 124 MMHG | WEIGHT: 195.5 LBS

## 2019-01-01 DIAGNOSIS — Z95.0 PACEMAKER: ICD-10-CM

## 2019-01-01 DIAGNOSIS — I25.10 CORONARY ARTERY DISEASE INVOLVING NATIVE CORONARY ARTERY OF NATIVE HEART WITHOUT ANGINA PECTORIS: ICD-10-CM

## 2019-01-01 DIAGNOSIS — I48.19 PERSISTENT ATRIAL FIBRILLATION (H): ICD-10-CM

## 2019-01-01 DIAGNOSIS — E78.49 OTHER HYPERLIPIDEMIA: ICD-10-CM

## 2019-01-01 DIAGNOSIS — Z95.0 CARDIAC PACEMAKER IN SITU: ICD-10-CM

## 2019-01-01 DIAGNOSIS — I48.19 PERSISTENT ATRIAL FIBRILLATION (H): Primary | ICD-10-CM

## 2019-01-01 DIAGNOSIS — Z95.0 CARDIAC PACEMAKER IN SITU: Primary | ICD-10-CM

## 2019-01-01 LAB
ALBUMIN UR-MCNC: NEGATIVE MG/DL
ANION GAP SERPL CALCULATED.3IONS-SCNC: 11.2 MMOL/L (ref 6–17)
ANION GAP SERPL CALCULATED.3IONS-SCNC: 7 MMOL/L (ref 5–18)
APPEARANCE UR: CLEAR
BACTERIA #/AREA URNS HPF: ABNORMAL HPF
BACTERIA SPEC CULT: NORMAL
BASOPHILS # BLD AUTO: 0 THOU/UL (ref 0–0.2)
BASOPHILS NFR BLD AUTO: 0 % (ref 0–2)
BILIRUB UR QL STRIP: NEGATIVE
BNP SERPL-MCNC: 511 PG/ML (ref 0–93)
BUN SERPL-MCNC: 27 MG/DL (ref 7–30)
BUN SERPL-MCNC: 40 MG/DL (ref 8–28)
BUN SERPL-MCNC: 43 MG/DL (ref 8–28)
BUN SERPL-MCNC: 47 MG/DL (ref 8–28)
CALCIUM SERPL-MCNC: 9.1 MG/DL (ref 8.5–10.5)
CALCIUM SERPL-MCNC: 9.3 MG/DL (ref 8.5–10.5)
CALCIUM SERPL-MCNC: 9.4 MG/DL (ref 8.5–10.5)
CALCIUM SERPL-MCNC: 9.7 MG/DL (ref 8.5–10.5)
CHLORIDE BLD-SCNC: 105 MMOL/L (ref 98–107)
CHLORIDE BLD-SCNC: 105 MMOL/L (ref 98–107)
CHLORIDE BLD-SCNC: 110 MMOL/L (ref 98–107)
CHLORIDE SERPL-SCNC: 107 MMOL/L (ref 98–107)
CO2 SERPL-SCNC: 23 MMOL/L (ref 22–31)
CO2 SERPL-SCNC: 27 MMOL/L (ref 23–29)
CO2 SERPL-SCNC: 30 MMOL/L (ref 22–31)
CO2 SERPL-SCNC: 30 MMOL/L (ref 22–31)
COLOR UR AUTO: YELLOW
CREAT SERPL-MCNC: 1.49 MG/DL (ref 0.7–1.3)
CREAT SERPL-MCNC: 1.6 MG/DL (ref 0.7–1.3)
CREAT SERPL-MCNC: 1.63 MG/DL (ref 0.7–1.3)
CREAT SERPL-MCNC: 1.63 MG/DL (ref 0.7–1.3)
EOSINOPHIL # BLD AUTO: 0.5 THOU/UL (ref 0–0.4)
EOSINOPHIL NFR BLD AUTO: 6 % (ref 0–6)
ERYTHROCYTE [DISTWIDTH] IN BLOOD BY AUTOMATED COUNT: 15.7 % (ref 11–14.5)
GFR SERPL CREATININE-BSD FRML MDRD: 39 ML/MIN/1.73M2
GFR SERPL CREATININE-BSD FRML MDRD: 39 ML/MIN/{1.73_M2}
GFR SERPL CREATININE-BSD FRML MDRD: 40 ML/MIN/1.73M2
GFR SERPL CREATININE-BSD FRML MDRD: 44 ML/MIN/1.73M2
GLUCOSE BLD-MCNC: 131 MG/DL (ref 70–125)
GLUCOSE BLD-MCNC: 88 MG/DL (ref 70–125)
GLUCOSE BLD-MCNC: 91 MG/DL (ref 70–125)
GLUCOSE SERPL-MCNC: 124 MG/DL (ref 70–105)
GLUCOSE UR STRIP-MCNC: NEGATIVE MG/DL
HCT VFR BLD AUTO: 37.2 % (ref 40–54)
HGB BLD-MCNC: 11.9 G/DL (ref 14–18)
HGB UR QL STRIP: NEGATIVE
KETONES UR STRIP-MCNC: NEGATIVE MG/DL
LEUKOCYTE ESTERASE UR QL STRIP: ABNORMAL
LYMPHOCYTES # BLD AUTO: 2.2 THOU/UL (ref 0.8–4.4)
LYMPHOCYTES NFR BLD AUTO: 26 % (ref 20–40)
MCH RBC QN AUTO: 30.4 PG (ref 27–34)
MCHC RBC AUTO-ENTMCNC: 32 G/DL (ref 32–36)
MCV RBC AUTO: 95 FL (ref 80–100)
MDC_IDC_EPISODE_DTM: NORMAL
MDC_IDC_EPISODE_DURATION: 1120 S
MDC_IDC_EPISODE_DURATION: 13 S
MDC_IDC_EPISODE_DURATION: 17 S
MDC_IDC_EPISODE_DURATION: 231 S
MDC_IDC_EPISODE_DURATION: 25 S
MDC_IDC_EPISODE_DURATION: 2709 S
MDC_IDC_EPISODE_DURATION: 3516 S
MDC_IDC_EPISODE_DURATION: 36 S
MDC_IDC_EPISODE_DURATION: 5 S
MDC_IDC_EPISODE_DURATION: 5 S
MDC_IDC_EPISODE_DURATION: 585 S
MDC_IDC_EPISODE_DURATION: 6 S
MDC_IDC_EPISODE_DURATION: 7 S
MDC_IDC_EPISODE_DURATION: 8 S
MDC_IDC_EPISODE_DURATION: 8 S
MDC_IDC_EPISODE_DURATION: 9 S
MDC_IDC_EPISODE_DURATION: 9 S
MDC_IDC_EPISODE_ID: NORMAL
MDC_IDC_EPISODE_TYPE: NORMAL
MDC_IDC_LEAD_IMPLANT_DT: NORMAL
MDC_IDC_LEAD_LOCATION: NORMAL
MDC_IDC_LEAD_MFG: NORMAL
MDC_IDC_LEAD_MODEL: NORMAL
MDC_IDC_LEAD_POLARITY_TYPE: NORMAL
MDC_IDC_LEAD_SERIAL: NORMAL
MDC_IDC_MSMT_BATTERY_DTM: NORMAL
MDC_IDC_MSMT_BATTERY_REMAINING_LONGEVITY: 30 MO
MDC_IDC_MSMT_BATTERY_REMAINING_PERCENTAGE: 52 %
MDC_IDC_MSMT_BATTERY_STATUS: NORMAL
MDC_IDC_MSMT_BATTERY_STATUS: NORMAL
MDC_IDC_MSMT_LEADCHNL_RA_IMPEDANCE_VALUE: 400 OHM
MDC_IDC_MSMT_LEADCHNL_RA_IMPEDANCE_VALUE: 402 OHM
MDC_IDC_MSMT_LEADCHNL_RA_PACING_THRESHOLD_AMPLITUDE: 0.4 V
MDC_IDC_MSMT_LEADCHNL_RA_PACING_THRESHOLD_AMPLITUDE: 0.4 V
MDC_IDC_MSMT_LEADCHNL_RA_PACING_THRESHOLD_PULSEWIDTH: 0.5 MS
MDC_IDC_MSMT_LEADCHNL_RA_PACING_THRESHOLD_PULSEWIDTH: 0.5 MS
MDC_IDC_MSMT_LEADCHNL_RA_SENSING_INTR_AMPL: 0.5 MV
MDC_IDC_MSMT_LEADCHNL_RV_IMPEDANCE_VALUE: 383 OHM
MDC_IDC_MSMT_LEADCHNL_RV_IMPEDANCE_VALUE: 386 OHM
MDC_IDC_MSMT_LEADCHNL_RV_PACING_THRESHOLD_AMPLITUDE: 0.7 V
MDC_IDC_MSMT_LEADCHNL_RV_PACING_THRESHOLD_AMPLITUDE: 0.9 V
MDC_IDC_MSMT_LEADCHNL_RV_PACING_THRESHOLD_PULSEWIDTH: 0.4 MS
MDC_IDC_MSMT_LEADCHNL_RV_PACING_THRESHOLD_PULSEWIDTH: 0.4 MS
MDC_IDC_MSMT_LEADCHNL_RV_SENSING_INTR_AMPL: 19 MV
MDC_IDC_PG_IMPLANT_DTM: NORMAL
MDC_IDC_PG_IMPLANT_DTM: NORMAL
MDC_IDC_PG_MFG: NORMAL
MDC_IDC_PG_MFG: NORMAL
MDC_IDC_PG_MODEL: NORMAL
MDC_IDC_PG_MODEL: NORMAL
MDC_IDC_PG_SERIAL: NORMAL
MDC_IDC_PG_SERIAL: NORMAL
MDC_IDC_PG_TYPE: NORMAL
MDC_IDC_PG_TYPE: NORMAL
MDC_IDC_SESS_CLINIC_NAME: NORMAL
MDC_IDC_SESS_CLINIC_NAME: NORMAL
MDC_IDC_SESS_DTM: NORMAL
MDC_IDC_SESS_DTM: NORMAL
MDC_IDC_SESS_TYPE: NORMAL
MDC_IDC_SESS_TYPE: NORMAL
MDC_IDC_SET_BRADY_AT_MODE_SWITCH_MODE: NORMAL
MDC_IDC_SET_BRADY_AT_MODE_SWITCH_MODE: NORMAL
MDC_IDC_SET_BRADY_AT_MODE_SWITCH_RATE: 170 {BEATS}/MIN
MDC_IDC_SET_BRADY_AT_MODE_SWITCH_RATE: 170 {BEATS}/MIN
MDC_IDC_SET_BRADY_LOWRATE: 70 {BEATS}/MIN
MDC_IDC_SET_BRADY_LOWRATE: 70 {BEATS}/MIN
MDC_IDC_SET_BRADY_MAX_SENSOR_RATE: 120 {BEATS}/MIN
MDC_IDC_SET_BRADY_MAX_SENSOR_RATE: 120 {BEATS}/MIN
MDC_IDC_SET_BRADY_MAX_TRACKING_RATE: 120 {BEATS}/MIN
MDC_IDC_SET_BRADY_MAX_TRACKING_RATE: 120 {BEATS}/MIN
MDC_IDC_SET_BRADY_MODE: NORMAL
MDC_IDC_SET_BRADY_MODE: NORMAL
MDC_IDC_SET_BRADY_PAV_DELAY_HIGH: 180 MS
MDC_IDC_SET_BRADY_PAV_DELAY_HIGH: 180 MS
MDC_IDC_SET_BRADY_PAV_DELAY_LOW: 300 MS
MDC_IDC_SET_BRADY_PAV_DELAY_LOW: 300 MS
MDC_IDC_SET_BRADY_SAV_DELAY_HIGH: 180 MS
MDC_IDC_SET_BRADY_SAV_DELAY_HIGH: 180 MS
MDC_IDC_SET_BRADY_SAV_DELAY_LOW: 300 MS
MDC_IDC_SET_BRADY_SAV_DELAY_LOW: 300 MS
MDC_IDC_SET_LEADCHNL_RA_PACING_AMPLITUDE: 2 V
MDC_IDC_SET_LEADCHNL_RA_PACING_AMPLITUDE: 2 V
MDC_IDC_SET_LEADCHNL_RA_PACING_ANODE_ELECTRODE_1: NORMAL
MDC_IDC_SET_LEADCHNL_RA_PACING_ANODE_LOCATION_1: NORMAL
MDC_IDC_SET_LEADCHNL_RA_PACING_CAPTURE_MODE: NORMAL
MDC_IDC_SET_LEADCHNL_RA_PACING_CATHODE_ELECTRODE_1: NORMAL
MDC_IDC_SET_LEADCHNL_RA_PACING_CATHODE_LOCATION_1: NORMAL
MDC_IDC_SET_LEADCHNL_RA_PACING_POLARITY: NORMAL
MDC_IDC_SET_LEADCHNL_RA_PACING_POLARITY: NORMAL
MDC_IDC_SET_LEADCHNL_RA_PACING_PULSEWIDTH: 0.5 MS
MDC_IDC_SET_LEADCHNL_RA_PACING_PULSEWIDTH: 0.5 MS
MDC_IDC_SET_LEADCHNL_RA_SENSING_ADAPTATION_MODE: NORMAL
MDC_IDC_SET_LEADCHNL_RA_SENSING_ADAPTATION_MODE: NORMAL
MDC_IDC_SET_LEADCHNL_RA_SENSING_ANODE_ELECTRODE_1: NORMAL
MDC_IDC_SET_LEADCHNL_RA_SENSING_ANODE_LOCATION_1: NORMAL
MDC_IDC_SET_LEADCHNL_RA_SENSING_CATHODE_ELECTRODE_1: NORMAL
MDC_IDC_SET_LEADCHNL_RA_SENSING_CATHODE_LOCATION_1: NORMAL
MDC_IDC_SET_LEADCHNL_RA_SENSING_POLARITY: NORMAL
MDC_IDC_SET_LEADCHNL_RA_SENSING_POLARITY: NORMAL
MDC_IDC_SET_LEADCHNL_RA_SENSING_SENSITIVITY: 0.5 MV
MDC_IDC_SET_LEADCHNL_RA_SENSING_SENSITIVITY: 0.5 MV
MDC_IDC_SET_LEADCHNL_RV_PACING_AMPLITUDE: 3.5 V
MDC_IDC_SET_LEADCHNL_RV_PACING_AMPLITUDE: 3.5 V
MDC_IDC_SET_LEADCHNL_RV_PACING_ANODE_ELECTRODE_1: NORMAL
MDC_IDC_SET_LEADCHNL_RV_PACING_ANODE_LOCATION_1: NORMAL
MDC_IDC_SET_LEADCHNL_RV_PACING_CAPTURE_MODE: NORMAL
MDC_IDC_SET_LEADCHNL_RV_PACING_CAPTURE_MODE: NORMAL
MDC_IDC_SET_LEADCHNL_RV_PACING_CATHODE_ELECTRODE_1: NORMAL
MDC_IDC_SET_LEADCHNL_RV_PACING_CATHODE_LOCATION_1: NORMAL
MDC_IDC_SET_LEADCHNL_RV_PACING_POLARITY: NORMAL
MDC_IDC_SET_LEADCHNL_RV_PACING_POLARITY: NORMAL
MDC_IDC_SET_LEADCHNL_RV_PACING_PULSEWIDTH: 0.4 MS
MDC_IDC_SET_LEADCHNL_RV_PACING_PULSEWIDTH: 0.4 MS
MDC_IDC_SET_LEADCHNL_RV_SENSING_ADAPTATION_MODE: NORMAL
MDC_IDC_SET_LEADCHNL_RV_SENSING_ADAPTATION_MODE: NORMAL
MDC_IDC_SET_LEADCHNL_RV_SENSING_ANODE_ELECTRODE_1: NORMAL
MDC_IDC_SET_LEADCHNL_RV_SENSING_ANODE_LOCATION_1: NORMAL
MDC_IDC_SET_LEADCHNL_RV_SENSING_CATHODE_ELECTRODE_1: NORMAL
MDC_IDC_SET_LEADCHNL_RV_SENSING_CATHODE_LOCATION_1: NORMAL
MDC_IDC_SET_LEADCHNL_RV_SENSING_POLARITY: NORMAL
MDC_IDC_SET_LEADCHNL_RV_SENSING_POLARITY: NORMAL
MDC_IDC_SET_LEADCHNL_RV_SENSING_SENSITIVITY: 2.5 MV
MDC_IDC_SET_LEADCHNL_RV_SENSING_SENSITIVITY: 2.5 MV
MDC_IDC_SET_ZONE_DETECTION_INTERVAL: 375 MS
MDC_IDC_SET_ZONE_DETECTION_INTERVAL: 375 MS
MDC_IDC_SET_ZONE_TYPE: NORMAL
MDC_IDC_SET_ZONE_TYPE: NORMAL
MDC_IDC_SET_ZONE_VENDOR_TYPE: NORMAL
MDC_IDC_SET_ZONE_VENDOR_TYPE: NORMAL
MDC_IDC_STAT_AT_BURDEN_PERCENT: 26 %
MDC_IDC_STAT_AT_DTM_END: NORMAL
MDC_IDC_STAT_AT_DTM_START: NORMAL
MDC_IDC_STAT_BRADY_DTM_END: NORMAL
MDC_IDC_STAT_BRADY_DTM_START: NORMAL
MDC_IDC_STAT_BRADY_RA_PERCENT_PACED: 51 %
MDC_IDC_STAT_BRADY_RV_PERCENT_PACED: 15 %
MDC_IDC_STAT_EPISODE_RECENT_COUNT: 0
MDC_IDC_STAT_EPISODE_RECENT_COUNT: 0
MDC_IDC_STAT_EPISODE_RECENT_COUNT: 17
MDC_IDC_STAT_EPISODE_RECENT_COUNT: 25
MDC_IDC_STAT_EPISODE_RECENT_COUNT: NORMAL
MDC_IDC_STAT_EPISODE_RECENT_COUNT_DTM_END: NORMAL
MDC_IDC_STAT_EPISODE_RECENT_COUNT_DTM_START: NORMAL
MDC_IDC_STAT_EPISODE_TOTAL_COUNT: 47
MDC_IDC_STAT_EPISODE_TOTAL_COUNT_DTM_END: NORMAL
MDC_IDC_STAT_EPISODE_TYPE: NORMAL
MDC_IDC_STAT_EPISODE_VENDOR_TYPE: NORMAL
MONOCYTES # BLD AUTO: 0.8 THOU/UL (ref 0–0.9)
MONOCYTES NFR BLD AUTO: 10 % (ref 2–10)
MUCOUS THREADS #/AREA URNS LPF: ABNORMAL LPF
NEUTROPHILS # BLD AUTO: 4.9 THOU/UL (ref 2–7.7)
NEUTROPHILS NFR BLD AUTO: 58 % (ref 50–70)
NITRATE UR QL: NEGATIVE
PH UR STRIP: 6.5 [PH] (ref 4.5–8)
PLATELET # BLD AUTO: 206 THOU/UL (ref 140–440)
PMV BLD AUTO: 12 FL (ref 8.5–12.5)
POTASSIUM BLD-SCNC: 4.4 MMOL/L (ref 3.5–5)
POTASSIUM BLD-SCNC: 4.5 MMOL/L (ref 3.5–5)
POTASSIUM BLD-SCNC: 4.8 MMOL/L (ref 3.5–5)
POTASSIUM SERPL-SCNC: 4.2 MMOL/L (ref 3.5–5.1)
RBC # BLD AUTO: 3.92 MILL/UL (ref 4.4–6.2)
RBC #/AREA URNS AUTO: ABNORMAL HPF
SODIUM SERPL-SCNC: 140 MMOL/L (ref 136–145)
SODIUM SERPL-SCNC: 141 MMOL/L (ref 136–145)
SODIUM SERPL-SCNC: 142 MMOL/L (ref 136–145)
SODIUM SERPL-SCNC: 142 MMOL/L (ref 136–145)
SP GR UR STRIP: 1.01 (ref 1–1.03)
SQUAMOUS #/AREA URNS AUTO: ABNORMAL LPF
UROBILINOGEN UR STRIP-ACNC: ABNORMAL
WBC #/AREA URNS AUTO: ABNORMAL HPF
WBC: 8.5 THOU/UL (ref 4–11)

## 2019-01-01 PROCEDURE — 36415 COLL VENOUS BLD VENIPUNCTURE: CPT | Performed by: INTERNAL MEDICINE

## 2019-01-01 PROCEDURE — 99214 OFFICE O/P EST MOD 30 MIN: CPT | Mod: 25 | Performed by: INTERNAL MEDICINE

## 2019-01-01 PROCEDURE — 80048 BASIC METABOLIC PNL TOTAL CA: CPT | Mod: GW | Performed by: INTERNAL MEDICINE

## 2019-01-01 PROCEDURE — 93280 PM DEVICE PROGR EVAL DUAL: CPT | Mod: GW | Performed by: INTERNAL MEDICINE

## 2019-01-01 PROCEDURE — 93296 REM INTERROG EVL PM/IDS: CPT | Performed by: INTERNAL MEDICINE

## 2019-01-01 PROCEDURE — 93294 REM INTERROG EVL PM/LDLS PM: CPT | Performed by: INTERNAL MEDICINE

## 2019-01-01 RX ORDER — DIGOXIN 125 MCG
62.5 TABLET ORAL DAILY
Qty: 90 TABLET | Refills: 3 | Status: SHIPPED | OUTPATIENT
Start: 2019-01-01

## 2019-01-01 RX ORDER — FUROSEMIDE 20 MG/1
10 TABLET ORAL
COMMUNITY

## 2019-01-01 RX ORDER — SERTRALINE HYDROCHLORIDE 100 MG/1
100 TABLET, FILM COATED ORAL DAILY
COMMUNITY

## 2019-01-01 ASSESSMENT — MIFFLIN-ST. JEOR: SCORE: 1523.03

## 2019-02-20 NOTE — TELEPHONE ENCOUNTER
Returned call to Carlene at [facility] and spoke with Cale PAULINO regarding device check for pt. Scheduled remote check for 3/13/19. Jose

## 2019-04-01 NOTE — TELEPHONE ENCOUNTER
Dr. Dawkins,  FYI: your patient has been in Afib 26% of the time since since May 21st 2018. According to the trend graph below, pt has been in Afib a majority of the time since January 2019.  Ventricular rates controlled, taking ASA only. You are scheduled to see this patient on 05/16/2019. Any changes?        [employer] K[employer] (D) Remote PPM Device Check  AP: 51%   : 15%  Mode: DDDR         Presenting Rhythm: Chronic Afib with  and VS events.   Heart Rate: Adequate heart rates per histogram   Sensing:  stable     Pacing Threshold: RA: not preformed RV: stable  Impedance: stable   Battery Status: 2.5 years remaining   Atrial Arrhythmia: 29,868 mode switch episodes comprising 26% of the time. Longest episode lasted greater than 1 hour but less than 24 hours, No EGM available. Ventricular rates controlled. EGMs available suggestive of Afib. Taking ASA only. Will notify Dr. Dawkins of increased Afib burden.  Ventricular Arrhythmia: 10 VHRs. 2 EGMs available show As>Vs for AFib with RVR lasting 5-19 beats, rates 120-175bpm.    Care Plan: FU annual threshold and OV with Dr. Dawkins on 05/16.

## 2019-04-17 NOTE — TELEPHONE ENCOUNTER
Received a call from Bryson's son Do on his behalf. Consent to communicate on file. She is a retired physician. She feels he has been in A.Fib consistently for the past month or two and he's had worsening right heart failure symptoms with significant lower extremity edema intermittently over this time.     They recently enrolled Bryson in Hospice and he has had his lasix dose adjusted as needed by his primary care provider and the Hospice team. She is aware that he may be nearing that end of his life, and that neither a cardioversion nor an A.Fib ablation would be feasible options for him. However, she wanted to discuss with Dr. Dawkins if he could possibly benefit from a trial of antiarrhythmics or if he had any other recommendations to try to keep him out of A.Fib and perhaps improve his heart failure symptoms to improve his overall quality of life for whatever time he has left.     Offered a sooner follow up visit than his currently scheduled annual visit on 5/16/19. His appointment was moved up to next week Wednesday 4/24/19. Will send an update to Dr. Dawkins. RONALD Brothers RN - 04/17/19, 1:35 PM

## 2019-04-23 NOTE — TELEPHONE ENCOUNTER
Received call from Home Health/Hospice Nurse Kasey calling to see if patient does need to be seen tomorrow.  He has significant heart failure and is very sensitive to diuretics.  He is currently taking 10mg of lasix BID he has weeping lower extremity edema.  Would like to know if Dr. Dawkins feels it necessary to be seen tomorrow.

## 2019-04-23 NOTE — TELEPHONE ENCOUNTER
Would be happy to see him tomorrow!     Contacted Hospice nurse Kasey with Dr. Dawkins's plan to see him 4-24-19.  Nurse will contact daughter Do.  With recommendation to be seen.

## 2019-04-24 NOTE — LETTER
4/24/2019      MD Andrew [facility] [address]   [place] [address]      RE: Bryson Saenz       Dear Colleague,    I had the pleasure of seeing Bryson Saenz in the [facility].    Service Date: 04/24/2019      HISTORY OF PRESENT ILLNESS:  It is as always a pleasure for me to see Mr. Saenz for followup of paroxysmal atrial fibrillation, sick sinus syndrome, coronary artery disease and diastolic heart failure.      It has been a year since I have seen him.  My first impression upon seeing him is that he has lost a significant amount of weight.  His daughter, Do, who is present is a retired anesthesiologist.  She confirms that he has been slowly declining.  In February of this year, he had a bout of congestive heart failure which required some time to resolve.  She is not sure whether there were any precipitating factors at the time, though she thinks he may have had a cold.  He was treated with diuretics which resulted in improvement of his shortness of breath and some ankle swelling.  However, it took several days to work and when he was being diuresed he would feel dizzy with posture and he has fallen several times.  Fortunately, there were no significant injuries.      Currently, he is doing better.  He does have some ulcers on his lower limbs due to the swelling.  These are not infected.  Do is wondering whether there is any effective prevention and/or treatment for his bouts of diastolic heart failure.      PHYSICAL EXAMINATION:  His JVP is not elevated.  Heart sounds are unchanged.  His chest is clear.  He does have at least 2+ bilateral ankle edema.      Had his pacer interrogated today.  He has about 30% AFib burden.  When he goes into them, heart rate is between 105-120.  He does not notice when he goes into AFib.      IMPRESSION:   1.  Persistent atrial fibrillation.   2.  Stable coronary artery disease with a remote history of LAD  stenting in .  No symptoms of angina.   3.  Sick sinus syndrome with normally functioning pacemaker.   4.  Senile frailty.      This would be a very challenging situation to treat.  With congestive heart failure exacerbation, there is probably no other way of treating it aside from diuretics.  I certainly appreciate the fine line we are walking with regards to diuresis and orthostasis.      Mr. Johnston is DNR/DNI and has elected not to be on oral anticoagulation.  I think the only way of effectively monitoring him may be hospitalization should he develop bouts of CHF exacerbation.  He is amenable to this suggestion.  I do not think there is imminent need for this though the chances of hospitalization would increase in the winter months.      As for the atrial fibrillation, heart rate may be a little bit high when he goes into it.  I have added a small dose of digoxin at 62.5 mcg.  His electrolytes were last checked in  and they were normal.  I will check it again today.      Provisionally, I have arranged to see him again in 6 months' time, in the fall.         MOISE CAVAZOS MD, [facility]             D: 2019   T: 2019   MT: AMILCAR      Name:     HANNAH JOHNSTON   MRN:      [id number]        Account:      [id number]   :      1922           Service Date: 2019      Document: [id number]       Outpatient Encounter Medications as of 2019   Medication Sig Dispense Refill     acetaminophen (TYLENOL) 500 MG tablet Take 500-1,000 mg by mouth every 6 hours as needed for mild pain       allopurinol (ZYLOPRIM) 300 MG tablet Take 300 mg by mouth daily       Ammonium Lactate (LAC-HYDRIN FIVE) 5 % LOTN Externally apply topically as needed       aspirin 81 MG tablet Take by mouth daily       digoxin (LANOXIN) 125 MCG tablet Take 0.5 tablets (62.5 mcg) by mouth daily 90 tablet 3     furosemide (LASIX) 10 mg TABS half-tab Take 10 mg by mouth 2 times daily       melatonin 3 MG tablet Take by mouth  nightly as needed for sleep       multivitamin (OCUVITE) TABS Take 1 tablet by mouth daily       sertraline (ZOLOFT) 100 MG tablet Take 100 mg by mouth daily       [DISCONTINUED] furosemide (LASIX) 20 MG tablet Take 20 mg by mouth daily       No facility-administered encounter medications on file as of 4/24/2019.        Again, thank you for allowing me to participate in the care of your patient.      Sincerely,    DR MOISE CAVAZOS MD     [facility]

## 2019-04-24 NOTE — PROGRESS NOTES
Service Date: 04/24/2019      HISTORY OF PRESENT ILLNESS:  It is as always a pleasure for me to see Mr. Saenz for followup of paroxysmal atrial fibrillation, sick sinus syndrome, coronary artery disease and diastolic heart failure.      It has been a year since I have seen him.  My first impression upon seeing him is that he has lost a significant amount of weight.  His daughter, Do, who is present is a retired anesthesiologist.  She confirms that he has been slowly declining.  In February of this year, he had a bout of congestive heart failure which required some time to resolve.  She is not sure whether there were any precipitating factors at the time, though she thinks he may have had a cold.  He was treated with diuretics which resulted in improvement of his shortness of breath and some ankle swelling.  However, it took several days to work and when he was being diuresed he would feel dizzy with posture and he has fallen several times.  Fortunately, there were no significant injuries.      Currently, he is doing better.  He does have some ulcers on his lower limbs due to the swelling.  These are not infected.  Do is wondering whether there is any effective prevention and/or treatment for his bouts of diastolic heart failure.      PHYSICAL EXAMINATION:  His JVP is not elevated.  Heart sounds are unchanged.  His chest is clear.  He does have at least 2+ bilateral ankle edema.      Had his pacer interrogated today.  He has about 30% AFib burden.  When he goes into them, heart rate is between 105-120.  He does not notice when he goes into AFib.      IMPRESSION:   1.  Persistent atrial fibrillation.   2.  Stable coronary artery disease with a remote history of LAD stenting in 2003.  No symptoms of angina.   3.  Sick sinus syndrome with normally functioning pacemaker.   4.  Senile frailty.      This would be a very challenging situation to treat.  With congestive heart failure exacerbation, there is  probably no other way of treating it aside from diuretics.  I certainly appreciate the fine line we are walking with regards to diuresis and orthostasis.      Mr. Johnston is DNR/DNI and has elected not to be on oral anticoagulation.  I think the only way of effectively monitoring him may be hospitalization should he develop bouts of CHF exacerbation.  He is amenable to this suggestion.  I do not think there is imminent need for this though the chances of hospitalization would increase in the winter months.      As for the atrial fibrillation, heart rate may be a little bit high when he goes into it.  I have added a small dose of digoxin at 62.5 mcg.  His electrolytes were last checked in 2016 and they were normal.  I will check it again today.      Provisionally, I have arranged to see him again in 6 months' time, in the fall.         MOISE CAVAZOS MD, [facility]             D: 2019   T: 2019   MT: AMILCAR      Name:     HANNAH JOHNSTON   MRN:      [id number]        Account:      [id number]   :      1922           Service Date: 2019      Document: [id number]

## 2019-04-24 NOTE — LETTER
4/24/2019    MD Andrew [place] [address]   [place] [address]    RE: Bryson Saenz       Dear Colleague,    I had the pleasure of seeing Bryson Saenz in the [facility].    Service Date: 04/24/2019      HISTORY OF PRESENT ILLNESS:  It is as always a pleasure for me to see Mr. Saenz for followup of paroxysmal atrial fibrillation, sick sinus syndrome, coronary artery disease and diastolic heart failure.      It has been a year since I have seen him.  My first impression upon seeing him is that he has lost a significant amount of weight.  His daughter, Do, who is present is a retired anesthesiologist.  She confirms that he has been slowly declining.  In February of this year, he had a bout of congestive heart failure which required some time to resolve.  She is not sure whether there were any precipitating factors at the time, though she thinks he may have had a cold.  He was treated with diuretics which resulted in improvement of his shortness of breath and some ankle swelling.  However, it took several days to work and when he was being diuresed he would feel dizzy with posture and he has fallen several times.  Fortunately, there were no significant injuries.      Currently, he is doing better.  He does have some ulcers on his lower limbs due to the swelling.  These are not infected.  Do is wondering whether there is any effective prevention and/or treatment for his bouts of diastolic heart failure.      PHYSICAL EXAMINATION:  His JVP is not elevated.  Heart sounds are unchanged.  His chest is clear.  He does have at least 2+ bilateral ankle edema.      Had his pacer interrogated today.  He has about 30% AFib burden.  When he goes into them, heart rate is between 105-120.  He does not notice when he goes into AFib.      IMPRESSION:   1.  Persistent atrial fibrillation.   2.  Stable coronary artery disease with a remote history of LAD  stenting in .  No symptoms of angina.   3.  Sick sinus syndrome with normally functioning pacemaker.   4.  Senile frailty.      This would be a very challenging situation to treat.  With congestive heart failure exacerbation, there is probably no other way of treating it aside from diuretics.  I certainly appreciate the fine line we are walking with regards to diuresis and orthostasis.      Mr. Johnston is DNR/DNI and has elected not to be on oral anticoagulation.  I think the only way of effectively monitoring him may be hospitalization should he develop bouts of CHF exacerbation.  He is amenable to this suggestion.  I do not think there is imminent need for this though the chances of hospitalization would increase in the winter months.      As for the atrial fibrillation, heart rate may be a little bit high when he goes into it.  I have added a small dose of digoxin at 62.5 mcg.  His electrolytes were last checked in 2016 and they were normal.  I will check it again today.      Provisionally, I have arranged to see him again in 6 months' time, in the fall.         MOISE DAWKINS MD, [facility]             D: 2019   T: 2019   MT: AMILCAR      Name:     HANNAH JOHNSTON   MRN:      [id number]        Account:      [id number]   :      1922           Service Date: 2019      Document: [id number]       Thank you for allowing me to participate in the care of your patient.      Sincerely,     DR MOISE DAWKINS MD     [facility]    cc:   Moise Dawkins MD  [address]  [place] [id number]

## 2019-08-08 NOTE — TELEPHONE ENCOUNTER
Patient missed [employer] transmission on 7/31/19. Sent letter 8/1, no response. Sent 1 week letter today